# Patient Record
Sex: MALE | Race: WHITE | NOT HISPANIC OR LATINO | URBAN - METROPOLITAN AREA
[De-identification: names, ages, dates, MRNs, and addresses within clinical notes are randomized per-mention and may not be internally consistent; named-entity substitution may affect disease eponyms.]

---

## 2018-03-23 ENCOUNTER — OFFICE VISIT CONVERTED (OUTPATIENT)
Dept: UROLOGY | Facility: CLINIC | Age: 83
End: 2018-03-23
Attending: UROLOGY

## 2018-03-23 ENCOUNTER — CONVERSION ENCOUNTER (OUTPATIENT)
Dept: SURGERY | Facility: CLINIC | Age: 83
End: 2018-03-23

## 2018-05-05 ENCOUNTER — OFFICE VISIT CONVERTED (OUTPATIENT)
Dept: FAMILY MEDICINE CLINIC | Age: 83
End: 2018-05-05
Attending: FAMILY MEDICINE

## 2018-06-06 ENCOUNTER — OFFICE VISIT CONVERTED (OUTPATIENT)
Dept: FAMILY MEDICINE CLINIC | Age: 83
End: 2018-06-06
Attending: FAMILY MEDICINE

## 2018-08-24 ENCOUNTER — OFFICE VISIT CONVERTED (OUTPATIENT)
Dept: FAMILY MEDICINE CLINIC | Age: 83
End: 2018-08-24
Attending: FAMILY MEDICINE

## 2018-09-04 ENCOUNTER — OFFICE VISIT CONVERTED (OUTPATIENT)
Dept: FAMILY MEDICINE CLINIC | Age: 83
End: 2018-09-04
Attending: FAMILY MEDICINE

## 2019-03-27 ENCOUNTER — CONVERSION ENCOUNTER (OUTPATIENT)
Dept: SURGERY | Facility: CLINIC | Age: 84
End: 2019-03-27

## 2019-03-27 ENCOUNTER — OFFICE VISIT CONVERTED (OUTPATIENT)
Dept: SURGERY | Facility: CLINIC | Age: 84
End: 2019-03-27
Attending: PHYSICIAN ASSISTANT

## 2019-06-10 ENCOUNTER — HOSPITAL ENCOUNTER (OUTPATIENT)
Dept: OTHER | Facility: HOSPITAL | Age: 84
Discharge: HOME | End: 2019-06-10
Attending: FAMILY MEDICINE

## 2019-06-10 ENCOUNTER — OFFICE VISIT CONVERTED (OUTPATIENT)
Dept: FAMILY MEDICINE CLINIC | Age: 84
End: 2019-06-10
Attending: FAMILY MEDICINE

## 2019-06-10 LAB
ALBUMIN SERPL-MCNC: 3.9 G/DL (ref 3.5–5)
ALBUMIN/GLOB SERPL: 1.5 {RATIO} (ref 1.4–2.6)
ALP SERPL-CCNC: 59 U/L (ref 56–155)
ALT SERPL-CCNC: 6 U/L (ref 10–40)
ANION GAP SERPL CALC-SCNC: 14 MMOL/L (ref 8–19)
APTT BLD: 26.2 S (ref 22.2–34.2)
AST SERPL-CCNC: 17 U/L (ref 15–50)
BILIRUB SERPL-MCNC: 0.99 MG/DL (ref 0.2–1.3)
BUN SERPL-MCNC: 7 MG/DL (ref 5–25)
BUN/CREAT SERPL: 9 {RATIO} (ref 6–20)
CALCIUM SERPL-MCNC: 9.1 MG/DL (ref 8.7–10.4)
CHLORIDE SERPL-SCNC: 102 MMOL/L (ref 99–111)
CONV CO2: 24 MMOL/L (ref 22–32)
CONV TOTAL PROTEIN: 6.5 G/DL (ref 6.3–8.2)
CREAT UR-MCNC: 0.81 MG/DL (ref 0.7–1.2)
ERYTHROCYTE [DISTWIDTH] IN BLOOD BY AUTOMATED COUNT: 19.6 % (ref 11.5–14.5)
FERRITIN SERPL-MCNC: 121 NG/ML (ref 30–300)
GFR SERPLBLD BASED ON 1.73 SQ M-ARVRAT: >60 ML/MIN/{1.73_M2}
GLOBULIN UR ELPH-MCNC: 2.6 G/DL (ref 2–3.5)
GLUCOSE SERPL-MCNC: 102 MG/DL (ref 70–99)
HBA1C MFR BLD: 10.5 G/DL (ref 14–18)
HCT VFR BLD AUTO: 31.8 % (ref 42–52)
INR PPP: 1.14 (ref 2–3)
IRON SERPL-MCNC: 34 UG/DL (ref 70–180)
MCH RBC QN AUTO: 32.6 PG (ref 27–31)
MCHC RBC AUTO-ENTMCNC: 33 G/DL (ref 33–37)
MCV RBC AUTO: 98.8 FL (ref 80–96)
OSMOLALITY SERPL CALC.SUM OF ELEC: 280 MOSM/KG (ref 273–304)
PLATELET # BLD AUTO: 136 10*3/UL (ref 130–400)
PMV BLD AUTO: 12.2 FL (ref 7.4–10.4)
POTASSIUM SERPL-SCNC: 4.2 MMOL/L (ref 3.5–5.3)
PROTHROMBIN TIME: 11.5 S (ref 9.4–12)
RBC # BLD AUTO: 3.22 10*6/UL (ref 4.7–6.1)
SODIUM SERPL-SCNC: 136 MMOL/L (ref 135–147)
WBC # BLD AUTO: 6.45 10*3/UL (ref 4.8–10.8)

## 2019-07-25 ENCOUNTER — OFFICE VISIT CONVERTED (OUTPATIENT)
Dept: FAMILY MEDICINE CLINIC | Age: 84
End: 2019-07-25
Attending: FAMILY MEDICINE

## 2019-08-20 ENCOUNTER — OFFICE VISIT CONVERTED (OUTPATIENT)
Dept: FAMILY MEDICINE CLINIC | Age: 84
End: 2019-08-20
Attending: FAMILY MEDICINE

## 2020-10-15 ENCOUNTER — HOSPITAL ENCOUNTER (OUTPATIENT)
Dept: OTHER | Facility: HOSPITAL | Age: 85
Discharge: HOME | End: 2020-10-15
Attending: FAMILY MEDICINE

## 2020-10-15 ENCOUNTER — OFFICE VISIT CONVERTED (OUTPATIENT)
Dept: FAMILY MEDICINE CLINIC | Age: 85
End: 2020-10-15
Attending: FAMILY MEDICINE

## 2020-10-15 LAB
ERYTHROCYTE [DISTWIDTH] IN BLOOD BY AUTOMATED COUNT: 17.2 % (ref 11.6–14.4)
HCT VFR BLD AUTO: 33.4 % (ref 42–52)
HGB BLD-MCNC: 11.2 G/DL (ref 14–18)
MCH RBC QN AUTO: 33.6 PG (ref 27–31)
MCHC RBC AUTO-ENTMCNC: 33.5 G/DL (ref 33–37)
MCV RBC AUTO: 100.3 FL (ref 80–96)
PLATELET # BLD AUTO: 108 10*3/UL (ref 130–400)
PMV BLD AUTO: ABNORMAL FL (ref 9.4–12.4)
RBC # BLD AUTO: 3.33 10*6/UL (ref 4.7–6.1)
WBC # BLD AUTO: 11.89 10*3/UL (ref 4.8–10.8)

## 2020-10-16 LAB
ALBUMIN SERPL-MCNC: 3.9 G/DL (ref 3.5–5)
ALBUMIN/GLOB SERPL: 1.5 {RATIO} (ref 1.4–2.6)
ALP SERPL-CCNC: 72 U/L (ref 56–155)
ALT SERPL-CCNC: <5 U/L (ref 10–40)
ANION GAP SERPL CALC-SCNC: 18 MMOL/L (ref 8–19)
AST SERPL-CCNC: 20 U/L (ref 15–50)
BILIRUB SERPL-MCNC: 1.65 MG/DL (ref 0.2–1.3)
BUN SERPL-MCNC: 14 MG/DL (ref 5–25)
BUN/CREAT SERPL: 18 {RATIO} (ref 6–20)
CALCIUM SERPL-MCNC: 8.8 MG/DL (ref 8.7–10.4)
CHLORIDE SERPL-SCNC: 101 MMOL/L (ref 99–111)
CONV CO2: 20 MMOL/L (ref 22–32)
CONV TOTAL PROTEIN: 6.5 G/DL (ref 6.3–8.2)
CREAT UR-MCNC: 0.8 MG/DL (ref 0.7–1.2)
FERRITIN SERPL-MCNC: 319 NG/ML (ref 30–300)
FOLATE SERPL-MCNC: 12.2 NG/ML (ref 4.8–20)
GFR SERPLBLD BASED ON 1.73 SQ M-ARVRAT: >60 ML/MIN/{1.73_M2}
GLOBULIN UR ELPH-MCNC: 2.6 G/DL (ref 2–3.5)
GLUCOSE SERPL-MCNC: 101 MG/DL (ref 70–99)
IRON SERPL-MCNC: 22 UG/DL (ref 70–180)
OSMOLALITY SERPL CALC.SUM OF ELEC: 281 MOSM/KG (ref 273–304)
POTASSIUM SERPL-SCNC: 4.2 MMOL/L (ref 3.5–5.3)
SODIUM SERPL-SCNC: 135 MMOL/L (ref 135–147)
TSH SERPL-ACNC: 0.87 M[IU]/L (ref 0.27–4.2)
VIT B12 SERPL-MCNC: 319 PG/ML (ref 211–911)

## 2021-05-15 VITALS — BODY MASS INDEX: 30.23 KG/M2 | HEIGHT: 68 IN | RESPIRATION RATE: 14 BRPM | WEIGHT: 199.5 LBS

## 2021-05-16 VITALS — BODY MASS INDEX: 27.36 KG/M2 | HEIGHT: 72 IN | RESPIRATION RATE: 16 BRPM | WEIGHT: 202 LBS

## 2021-05-18 NOTE — PROGRESS NOTES
Keenan Hall 04/12/1930     Office/Outpatient Visit    Visit Date: Thu, Jul 25, 2019 09:29 am    Provider: Steve Medina MD (Assistant: Isabella Palacios RN)    Location: [facility]        Electronically signed by Steve Medina MD on  07/26/2019 08:01:03 PM                             SUBJECTIVE:        CC: physical exam         HPI:         Ed is here for a Medicare wellness visit.  The required HRA questions are integrated within this visit note. Family medical history and individual medical/surgical history were reviewed and updated.  A current height, weight, BMI, blood pressure, and pulse were recorded in the vitals section of the note and have been reviewed. Patient's medications, including supplements, were recorded in the chart and reviewed.  Current providers and suppliers were reviewed and updated.          Self-Assessment of Health: He rates his health as very good. He rates his confidence of being able to control/manage most of his health problems as very confident. His physical/emotional health has limited his social activites not at all.  A review of cognitive impairment was performed, including ability to drive a car, manage finances, and any memory changes, and was found to be negative.  A review of functional ability, including bathing, dressing, walking, and urine/bowel continence as well as level of safety was performed and was found to be negative.  Falls Risk: Has not had any falls or only one fall without injury in the past year.  He denies having trouble hearing the TV/radio when others do not, having to strain to hear or understand conversations and wearing hearing aid(s).  Concerning home safety, he reports that at home he DOES have adequate lighting, a skid resistant shower/tub, grab bars in the bath, handrails on stairs, functioning smoke alarms and absence of throw rugs.          Immunization Status: Up to date; Physical Activity: He exercises but less than 20 minutes 3  days per week; Type of diet patient normally eats is described as well-balanced with fruits and vegetables Tobacco: He has a past history of cigarette smoking; quit date:  1989.      ROS:     CONSTITUTIONAL:  Negative for chills and fever.      CARDIOVASCULAR:  Negative for chest pain and palpitations.      RESPIRATORY:  Negative for recent cough and dyspnea.      GASTROINTESTINAL:  Negative for abdominal pain, nausea and vomiting.      INTEGUMENTARY:  Negative for atypical mole(s) and rash.          PMH/FMH/SH:     Last Reviewed on 7/25/2019 10:15 AM by Steve Medina    Past Medical History:             PAST MEDICAL HISTORY         Positive for    Atrial Fibrillation;     Positive for    diverticular hemmorage 4/2017;     Positive for    Benign Prostatic Hypertrophy;     Positive for    Shingles 2009;         CURRENT MEDICAL PROVIDERS:    Cardiologist: [facility] Cardiologist    Dermatologist: Dr. Bullock    Ophthalmologist: Melquiades- optomitraureliano    Dentist - Quinten         ADVANCE DIRECTIVE Living will His daughters would make decisions for him if needed.          PREVENTIVE HEALTH MAINTENANCE             COLORECTAL CANCER SCREENING: Up to date (colonoscopy q10y; sigmoidoscopy q5y; Cologuard q3y) was last done 04/2011, Results are in chart; colonoscopy with normal results; diverticulosis         Surgical History:         Biopsy of colonic polyp; 1975         Procedures:     Positive for    Cataract Removal: right; 2009; and    Fracture(s):    Closed fracture of knee;     Positive for    Cataract Removal and    shoulder surgery;;         Family History:     Father: Congestive Heart Failure ( age 85 )     Mother: Lymphoma         Social History: Retired.     Occupation: Retired (Prior occupation: [employer])     Marital Status:  X 2     Children: 5 children         Tobacco/Alcohol/Supplements:     Last Reviewed on 7/25/2019 10:15 AM by Steve Medina    Tobacco: He has a past history of  cigarette smoking; quit date:  1989.          Alcohol: Frequency: Daily When he drinks, the average quantity of alcohol is 4 drinks.          Substance Abuse History:     Last Reviewed on 7/25/2019 10:15 AM by Steve Medina        Mental Health History:     Last Reviewed on 7/25/2019 10:15 AM by Steve Medina        Communicable Diseases (eg STDs):     Last Reviewed on 7/25/2019 10:15 AM by Steve Medina            Current Problems:     Last Reviewed on 7/25/2019 10:15 AM by Steve Medina    BPH     Hypertension     Low back pain     Hypercholesterolemia     Hemorrhagic diverticulosis of colon     Coronary artery disease, of native coronary artery     Atrial fibrillation     Anemia due to acute blood loss     Gastrointestinal hemorrhage, unspecified     Follow-up examination         Immunizations:     Td adult 5/23/2017     Prevnar 13 (Pneumococcal PCV 13) 1/5/2015     Fluvirin (4 + years dose) 8/8/2014     Fluzone High-Dose pf (>=65 yr) 10/15/2016     Influenza Virus Vaccine, unspecified formulation 9/1/2017     Pneumococcal, 23-valent IM/SC (adult and pt >=2yr) 12/0/2002     PNEUMOVAX 23 (Pneumococcal PPV23) 12/11/2007     Adacel (Tdap) 12/11/2007     Zostavax (Zoster live) 0/0/2010         Allergies:     Last Reviewed on 7/25/2019 10:15 AM by Steve Medina    Bactrim DS:    Doxycycline:    Meloxicam: rectal bleeding    Meloxicam: bleeding (Adverse Reaction)    Codeine:        Current Medications:     Last Reviewed on 7/25/2019 10:15 AM by Steve Medina    Finasteride 5mg Tablet Take 1 tablet(s) by mouth daily     Centrum Silver Men 50+ Tablet Take 1 tablet(s) by mouth daily with food.     Mupirocin 2% Topical Ointment apply affected area Qam         OBJECTIVE:        Vitals:         Current: 7/25/2019 9:37:06 AM    Ht:  6 ft, 0 in;  Wt: 196 lbs;  BMI: 26.6    T: 97.5 F (oral);  BP: 121/62 mm Hg (right arm, sitting);  P: 83 bpm (right arm (BP Cuff), sitting);   sCr: 0.81 mg/dL;  GFR: 64.51        Exams:     PHYSICAL EXAM:     GENERAL: Vitals recorded well developed, well nourished;     EYES: extraocular movements intact; conjunctiva and cornea are normal; PERRL; binocular vision is 20/25 - hearing is somewhat diminished bilaterally     E/N/T: EARS:  normal external auditory canals and tympanic membranes;  grossly normal hearing; OROPHARYNX:  normal mucosa, dentition, gingiva, and posterior pharynx;     NECK: range of motion is normal; thyroid is non-palpable; carotid exam is normal with good upstroke and no bruits;     RESPIRATORY: normal respiratory rate and pattern with no distress; normal breath sounds with no rales, rhonchi, wheezes or rubs;     CARDIOVASCULAR: normal rate; rhythm is regular;  no systolic murmur;     GASTROINTESTINAL: nontender; normal bowel sounds; no masses;     LYMPHATIC: no enlargement of cervical or facial nodes; no supraclavicular nodes;     SKIN:  no significant rashes or lesions; no suspicious moles;     NEUROLOGIC: mental status: alert and oriented x 3; cranial nerves II-XII grossly intact;     PSYCHIATRIC: appropriate affect and demeanor; normal psychomotor function;         Lab/Test Results:             Hemoglobin:  13.4 (07/25/2019),     Performed by::  asael (07/25/2019),             ASSESSMENT           V70.0   Z00.01  Yearly physical exam              DDx:     285.1   D62  Anemia due to acute blood loss              DDx:         ORDERS:         Radiology/Test Orders:       3017F  Colorectal CA screen results documented and reviewed (PV)  (In-House)           Lab Orders:       [id number]  BDHGB - [facility] Hemoglobin (Hgb)  (In-House)           Procedures Ordered:       [id number]  Collection of capillary blood specimen (eg, finger, heel, ear stick)  (In-House)           Other Orders:         Depression screen negative  (In-House)         1101F  Pt screen for fall risk; document no falls in past year or only 1 fall w/o injury in past year (ADI)   (In-House)           Positive EtOH screen with counseling documented  (In-House)           Subsequent Annual Well Visit Medicare (x1)                 PLAN:          Yearly physical exam     Today, we have reviewed Ed's care.  I'm not recommending changes in medication for now.  We will repeat a hemoglobin level at this time and see where that stands.  See attached wellness recommendations.  His memory screening was borderline, but he is very functional and I am not suggesting medication related to that or alteration of habits.     MIPS PHQ-9 Depression Screening: Completed form scanned and in chart; Total Score 4; Negative Depression Screen Positive alcohol screen: Brief counseling on the harms of alcohol use (less than 8 minutes).      ADDENDUM - Please let Ed know that his blood counts are normal.  No changes there.  With regard to metoprolol, Dr. Zurita's office does recommend he stay on that.  I did send a refill just now for a year to [facility] and would advise him to continue it.  Let me know if he has concerns. - Steve Medina MD - 7/2/519 - 12:42 7/25/19 1:00pm  pt inf/th           Orders:         Depression screen negative  (In-House)         1101F  Pt screen for fall risk; document no falls in past year or only 1 fall w/o injury in past year (ADI)  (In-House)           Positive EtOH screen with counseling documented  (In-House)         3017F  Colorectal CA screen results documented and reviewed (PV)  (In-House)            Anemia due to acute blood loss     LABORATORY:  Labs ordered to be performed today include Hemoglobin.            Orders:       [id number]  BDHGB - [facility] Hemoglobin (Hgb)  (In-House)         21341  Collection of capillary blood specimen (eg, finger, heel, ear stick)  (In-House)               CHARGE CAPTURE           **Please note: ICD descriptions below are intended for billing purposes only and may not represent clinical diagnoses**        Primary Diagnosis:         V70.0  Yearly physical exam            Z00.01    Encounter for general adult medical examination with abnormal findings              Orders:          [id number]   Preventive medicine, established patient, age 65+ years  (In-House)                                           Subsequent Annual Well Visit Medicare (x1)              Depression screen negative  (In-House)             1101F   Pt screen for fall risk; document no falls in past year or only 1 fall w/o injury in past year (ADI)  (In-House)                Positive EtOH screen with counseling documented  (In-House)             3017F   Colorectal CA screen results documented and reviewed (PV)  (In-House)           285.1 Anemia due to acute blood loss            D62    Acute posthemorrhagic anemia              Orders:          51191   BDHGB - [facility] Hemoglobin (Hgb)  (In-House)             52319   Collection of capillary blood specimen (eg, finger, heel, ear stick)  (In-House)

## 2021-05-18 NOTE — PROGRESS NOTES
Keenan Hall 04/12/1930     Office/Outpatient Visit    Visit Date: Wed, Jun 6, 2018 02:43 pm    Provider: Steve Medina MD (Assistant: Carmen Coto MA)    Location: [facility]        Electronically signed by Steve Medina MD on  06/16/2018 10:45:48 AM                             SUBJECTIVE:        CC: lower GI bleed     Ed is a 88 year old White male.  He is here today following a transition of care from an inpatient hospital: [facility]. The patient was admitted on 5-22-18 AND DISCHARGED 5-28-18. The patient was admitted for RECTAL BLEEDING. Our office called the patient within 48 hours of discharge and scheduled the follow-up appointment. During the patient's hospital stay the patient was treated by .  The patient is accompanied into the exam room by his daughter.          HPI:     Ed is in today for followup as noted above.  He developed significant lower gastrointestinal bleeding which led to hospital admission for the better part of a week.  He did receive transfusion while in the hospital and was treated appropriately.  Most of his home meds were stopped at discharge.  There was some concern that meloxicam may have contributed to this.  He does also have a history of diverticulosis of the colon and did have a very significant bleed approximately one year ago.          He also has history of presumed atrial fibrillation, although I do not have that clearly identified in his chart.  He has been on digoxin.  Most of his medications actually are prescribed through cardiology and specialists.  He since getting home has been doing relatively well.  He denies any recurrent bleeding.  He states his stool seems relatively normal.          His other history, problems, and medicines are reviewed today.  He did have a syncopal episode while he was in the hospital. Apparently there was some concern that he could have lost his pulse, and chest compressions were done briefly. He recovered from that  fairly well and is doing well overall.     ROS:     CONSTITUTIONAL:  Negative for chills and fever.      CARDIOVASCULAR:  Negative for palpitations.      RESPIRATORY:  Negative for recent cough and dyspnea.      GASTROINTESTINAL:  Negative for abdominal pain, nausea and vomiting.      INTEGUMENTARY:  Negative for atypical mole(s) and rash.          PMH/FMH/SH:     Last Reviewed on 6/06/2018 05:18 PM by Steve Medina    Past Medical History:             PAST MEDICAL HISTORY         Positive for    Atrial Fibrillation;     Positive for    diverticular hemmorage 4/2017;     Positive for    Benign Prostatic Hypertrophy;     Positive for    Shingles 2009;         CURRENT MEDICAL PROVIDERS:    Cardiologist: [facility] Cardiologist    Dermatologist: Dr. Bullock    Ophthalmologist: Melquiades- optomitraureliano    Dentist - Quinten         PREVENTIVE HEALTH MAINTENANCE             COLONOSCOPY: Done within the last 10 years was last done 4/19/17, which was abnormal     INFLUENZA VACCINE: was last done 10/15/16     Prevnar 13: was last done 1/5/15     PNEUMOCOCCAL 23 VACCINE: was last done 12/11/07     Tdap VACCINE: was last done 12/11/07         Surgical History:         Biopsy of colonic polyp; 1975         Procedures:     Positive for    Cataract Removal: right; 2009; and    Fracture(s):    Closed fracture of knee;     Positive for    Cataract Removal and    shoulder surgery;;         Family History:     Father: Congestive Heart Failure ( age 85 )     Mother: Lymphoma         Social History: Retired.     Occupation: Retired (Prior occupation: [employer])     Marital Status:  X 2     Children: 5 children         Tobacco/Alcohol/Supplements:     Last Reviewed on 6/06/2018 05:18 PM by Steve Medina    Tobacco: He has a past history of cigarette smoking; quit date:  1989.          Alcohol: Frequency: Daily When he drinks, the average quantity of alcohol is 4 drinks.          Substance Abuse History:     Last  Reviewed on 6/06/2018 05:18 PM by Steve Medina        Mental Health History:     Last Reviewed on 6/06/2018 05:18 PM by Steve Medina        Communicable Diseases (eg STDs):     Last Reviewed on 6/06/2018 05:18 PM by Steve Medina            Current Problems:     Last Reviewed on 6/06/2018 05:18 PM by Steve Medina    Hypercholesterolemia     Hemorrhagic diverticulosis of colon     Coronary artery disease, of native coronary artery     Atrial fibrillation     Anterior chest wall pain     Acute posthemorrhagic anemia     Follow-up examination     Neck pain         Immunizations:     Td adult 5/23/2017     Prevnar 13 (Pneumococcal PCV 13) 1/5/2015     Fluvirin (4 + years dose) 8/8/2014     Fluzone High-Dose pf (>=65 yr) 10/15/2016     Influenza Virus Vaccine, unspecified formulation 9/1/2017     Pneumococcal, 23-valent IM/SC (adult and pt >=2yr) 12/0/2002     PNEUMOVAX 23 (Pneumococcal PPV23) 12/11/2007     Adacel (Tdap) 12/11/2007     Zostavax (Zoster) 0/0/2010         Allergies:     Last Reviewed on 6/06/2018 05:18 PM by Steve Medina    Bactrim DS:    Doxycycline:    Codeine:    Meloxicam: rectal bleeding    Meloxicam: bleeding (Adverse Reaction)        Current Medications:     Last Reviewed on 6/06/2018 05:18 PM by Steve Medina    Digoxin 125mcg Tablet 1 tab daily     Atorvastatin Calcium 20mg Tablet Take 1 tablet(s) by mouth daily     Furosemide 40mg Tablets Take 1/2 tablet(s) by mouth daily     Potassium Chloride 10mEq Tablets, Extended Release Take 1 tablet(s) by mouth daily     Finasteride 5mg Tablet Take 1 tablet(s) by mouth daily     Centrum Silver Men 50+ Tablet Take 1 tablet(s) by mouth daily with food.     Aspirin (ASA) 81mg Tablets, Enteric Coated 1 tab daily     Protonix 40mg Tablets, Delayed Release 1 tab daily     Metoprolol Succinate 25mg Tablets, Extended Release Take 1/2 tab by mouth  daily         OBJECTIVE:        Vitals:         Current:  6/6/2018 2:45:54 PM    Ht:  6 ft, 0 in;  Wt: 193.4 lbs;  BMI: 26.2    T: 97.5 F (oral);  BP: 132/76 mm Hg (left arm, sitting);  P: 88 bpm (left arm (BP Cuff), sitting);  sCr: 0.93 mg/dL;  GFR: 56.91        Exams:     PHYSICAL EXAM:     GENERAL: Vitals recorded well developed, well nourished;     EYES: extraocular movements intact; conjunctiva and cornea are normal; PERRL;     E/N/T: EARS:  normal external auditory canals and tympanic membranes;  grossly normal hearing; OROPHARYNX:  normal mucosa, dentition, gingiva, and posterior pharynx;     NECK: range of motion is normal; thyroid is non-palpable;     RESPIRATORY: normal respiratory rate and pattern with no distress; normal breath sounds with no rales, rhonchi, wheezes or rubs;     CARDIOVASCULAR: normal rate; rhythm is regular;  no systolic murmur;     GASTROINTESTINAL: nontender; normal bowel sounds; no masses;     SKIN:  no significant rashes or lesions; no suspicious moles;     MUSCULOSKELETAL: there is mild chest wall tenderness noted on exam;     NEUROLOGIC: mental status: alert and oriented x 3; cranial nerves II-XII grossly intact;         ASSESSMENT           562.12   K57.31  Hemorrhagic diverticulosis of colon              DDx:     427.31   I48.91  Atrial fibrillation              DDx:     285.1   D62  Acute posthemorrhagic anemia              DDx:     786.52   R07.89  Anterior chest wall pain              DDx:         ORDERS:         Lab Orders:       [id number]  [facility] CBC w/o diff  (Send-Out)         [id number]  COMP - [facility] Comp. Metabolic Panel  (Send-Out)                   PLAN:          Hemorrhagic diverticulosis of colon     1)Ed seems well considering the illness that he had.      2)We will repeat blood work on him today.      3)We will contact [facility] for additional records to try to help clarify his discharge medicines.  He thinks that he is only discharged on furosemide and Protonix.  Will clarify that and be back in touch with him after we review  the blood work.         LABORATORY:  Labs ordered to be performed today include CBC W/O DIFF.            Orders:       [id number]  BD2 - [facility] CBC w/o diff  (Send-Out)             Patient Education Handouts:       Diverticulosis and Diverticulitis           Atrial fibrillation     LABORATORY:  Labs ordered to be performed today include Comprehensive metabolic panel.            Orders:       [id number]  COMP - [facility] Comp. Metabolic Panel  (Send-Out)            Acute posthemorrhagic anemia As above.          Anterior chest wall pain As above.             CHARGE CAPTURE           **Please note: ICD descriptions below are intended for billing purposes only and may not represent clinical diagnoses**        Primary Diagnosis:         562.12 Hemorrhagic diverticulosis of colon            K57.31    Diverticulosis of large intestine without perforation or abscess with bleeding              Orders:          [id number]   Transitional care manage service 14 day discharge  (In-House)           427.31 Atrial fibrillation            I48.91    Unspecified atrial fibrillation    285.1 Acute posthemorrhagic anemia            D62    Acute posthemorrhagic anemia    786.52 Anterior chest wall pain            R07.89    Other chest pain

## 2021-05-18 NOTE — PROGRESS NOTES
Keenan HallLeah 04/12/1930     Office/Outpatient Visit    Visit Date: Fri, Aug 24, 2018 11:00 am    Provider: Steve Medina MD (Assistant: Cassie Denny MA)    Location: [facility]        Electronically signed by Steve Medina MD on  08/24/2018 05:45:53 PM                             SUBJECTIVE:        CC: low back pain         HPI:     Ed has been having an off and on issue with pain in the L lower back.  This has been an issue for some time - 1-2 years.  He says that he has intermittent pain with this.  He is not able to sleep well with this.  He wanted to check on whether there was anything that could be done to help this.  He is not able to take NSAIDs due to previous bleeding issues.  He denies radiating pain into the leg or buttocks.  He says that this will eventually resolve.     ROS:     CONSTITUTIONAL:  Negative for chills and fever.      CARDIOVASCULAR:  Negative for chest pain and palpitations.      RESPIRATORY:  Negative for recent cough and dyspnea.      GASTROINTESTINAL:  Negative for abdominal pain, nausea and vomiting.          [facility]/:     Last Reviewed on 8/24/2018 11:19 AM by Steve Medina    Past Medical History:             PAST MEDICAL HISTORY         Positive for    Atrial Fibrillation;     Positive for    diverticular hemmorage 4/2017;     Positive for    Benign Prostatic Hypertrophy;     Positive for    Shingles 2009;         CURRENT MEDICAL PROVIDERS:    Cardiologist: [facility] Cardiologist    Dermatologist: Dr. Bullock    Ophthalmologist: Melquiades- optomitrist    Dentist - Quinten         PREVENTIVE HEALTH MAINTENANCE             COLORECTAL CANCER SCREENING: Up to date (colonoscopy q10y; sigmoidoscopy q5y; Cologuard q3y) was last done 04/2011, Results are in chart; colonoscopy with normal results; diverticulosis         Surgical History:         Biopsy of colonic polyp; 1975         Procedures:     Positive for    Cataract Removal: right; 2009; and     Fracture(s):    Closed fracture of knee;     Positive for    Cataract Removal and    shoulder surgery;;         Family History:     Father: Congestive Heart Failure ( age 85 )     Mother: Lymphoma         Social History: Retired.     Occupation: Retired (Prior occupation: [employer])     Marital Status:  X 2     Children: 5 children         Tobacco/Alcohol/Supplements:     Last Reviewed on 8/24/2018 11:19 AM by Steve Medina    Tobacco: He has a past history of cigarette smoking; quit date:  1989.          Alcohol: Frequency: Daily When he drinks, the average quantity of alcohol is 4 drinks.          Substance Abuse History:     Last Reviewed on 8/24/2018 11:19 AM by Steve Medina        Mental Health History:     Last Reviewed on 8/24/2018 11:19 AM by Steve Medina        Communicable Diseases (eg STDs):     Last Reviewed on 8/24/2018 11:19 AM by Steve Medina            Current Problems:     Last Reviewed on 8/24/2018 11:19 AM by Steve Medina    Low back pain     Hypercholesterolemia     Hemorrhagic diverticulosis of colon     Coronary artery disease, of native coronary artery     Atrial fibrillation         Immunizations:     Td adult 5/23/2017     Prevnar 13 (Pneumococcal PCV 13) 1/5/2015     Fluvirin (4 + years dose) 8/8/2014     Fluzone High-Dose pf (>=65 yr) 10/15/2016     Influenza Virus Vaccine, unspecified formulation 9/1/2017     Pneumococcal, 23-valent IM/SC (adult and pt >=2yr) 12/0/2002     PNEUMOVAX 23 (Pneumococcal PPV23) 12/11/2007     Adacel (Tdap) 12/11/2007     Zostavax (Zoster) 0/0/2010         Allergies:     Last Reviewed on 8/24/2018 11:19 AM by Steve Medina    Bactrim DS:    Doxycycline:    Meloxicam: rectal bleeding    Meloxicam: bleeding (Adverse Reaction)    Codeine:        Current Medications:     Last Reviewed on 8/24/2018 11:19 AM by Steve Medina    Furosemide 40mg Tablets Take 1/2 tablet(s) by mouth daily     Finasteride  5mg Tablet Take 1 tablet(s) by mouth daily     Centrum Silver Men 50+ Tablet Take 1 tablet(s) by mouth daily with food.     Aspirin (ASA) 81mg Tablets, Enteric Coated 1 tab daily     Metoprolol Succinate 25mg Tablets, Extended Release Take 1/2 tab by mouth  daily         OBJECTIVE:        Vitals:         Current: 8/24/2018 11:03:22 AM    Ht:  6 ft, 0 in;  Wt: 196.2 lbs;  BMI: 26.6    T: 97.8 F (oral);  BP: 132/75 mm Hg (left arm, sitting);  P: 75 bpm (left arm (BP Cuff), sitting);  sCr: 0.94 mg/dL;  GFR: 56.65        Exams:     PHYSICAL EXAM:     GENERAL: Vitals recorded well developed, well nourished;     NECK: range of motion is normal; thyroid is non-palpable;     RESPIRATORY: normal respiratory rate and pattern with no distress; normal breath sounds with no rales, rhonchi, wheezes or rubs;     CARDIOVASCULAR: normal rate; rhythm is regular;  no systolic murmur;     GASTROINTESTINAL: nontender; normal bowel sounds; no masses;     SKIN:  no significant rashes or lesions; no suspicious moles;     MUSCULOSKELETAL: there is not any focal tenderness over the spine and the mid back;     NEUROLOGIC: mental status: alert and oriented x 3; cranial nerves II-XII grossly intact;         ASSESSMENT           724.2   M54.5  Low back pain              DDx:         ORDERS:         Meds Prescribed:       Baclofen 10mg Tablet Take 1 tablet(s) by mouth bid prn  #40 (Forty) tablet(s) Refills: 1                 PLAN:          Low back pain         RECOMMENDATIONS given include: Given Ed's health history, we have somewhat limited options.  This is an intermittent issue.  I'm going to recommend a trial of low dose baclofen to see if it is helpful when it flares.  Risks are discussed including possible confusion related to the medication..            Prescriptions:       Baclofen 10mg Tablet Take 1 tablet(s) by mouth bid prn  #40 (Forty) tablet(s) Refills: 1           Patient Education Handouts:       [facility] Medication Compliance               CHARGE CAPTURE           **Please note: ICD descriptions below are intended for billing purposes only and may not represent clinical diagnoses**        Primary Diagnosis:         724.2 Low back pain            M54.5    Low back pain              Orders:          [id number]   Office/outpatient visit; established patient, level 3  (In-House)

## 2021-05-18 NOTE — PROGRESS NOTES
Keenan HallLeah 04/12/1930     Office/Outpatient Visit    Visit Date: Sat, May 5, 2018 09:49 am    Provider: Francis Andujar MD (Assistant: Isabella Palacios RN)    Location: [facility]        Electronically signed by Francis Andujar MD on  05/05/2018 12:53:14 PM                             SUBJECTIVE:        CC:     Ed is a 88 year old White male.  Neck pain (PT IS NOT TAKING DIGOXIN, ATORVASTATIN OR POTASSIUM)         HPI:         Patient complains of neck pain.  The location of discomfort is posterior and on the left side.  There is no radiation.  The pain is characterized as moderate in intensity and intermittent.  Initial onset was 2 weeks ago.  There was no obvious precipitating event or injury.  He denies associated headache and neck stiffness.      ROS:     CONSTITUTIONAL:  Negative for chills and fever.      MUSCULOSKELETAL:  Negative for myalgias.      NEUROLOGICAL:  Negative for paresthesias and weakness.          PMH/FMH/SH:     Last Reviewed on 5/05/2018 10:29 AM by Francis Andujar    Past Medical History:             PAST MEDICAL HISTORY         Positive for    Atrial Fibrillation;     Positive for    diverticular hemmorage 4/2017;     Positive for    Benign Prostatic Hypertrophy;     Positive for    Shingles 2009;         CURRENT MEDICAL PROVIDERS:    Cardiologist: [facility] Cardiologist    Dermatologist: Dr. Bullock    Ophthalmologist: Melquiades- optomitraureliano    Dentist - Quinten         PREVENTIVE HEALTH MAINTENANCE             COLONOSCOPY: Done within the last 10 years was last done 4/19/17, which was abnormal     INFLUENZA VACCINE: was last done 10/15/16     Prevnar 13: was last done 1/5/15     PNEUMOCOCCAL 23 VACCINE: was last done 12/11/07     Tdap VACCINE: was last done 12/11/07         Surgical History:         Biopsy of colonic polyp; 1975         Procedures:     Positive for    Cataract Removal: right; 2009; and    Fracture(s):    Closed fracture of knee;     Positive  for    Cataract Removal and    shoulder surgery;;         Family History:     Father: Congestive Heart Failure ( age 85 )     Mother: Lymphoma         Social History: Retired.     Occupation: Retired (Prior occupation: [employer])     Marital Status:  X 2     Children: 5 children         Tobacco/Alcohol/Supplements:     Last Reviewed on 5/05/2018 10:26 AM by Francis Andujar    Tobacco: He has a past history of cigarette smoking; quit date:  1989.          Alcohol: Frequency: Daily When he drinks, the average quantity of alcohol is 4 drinks.          Substance Abuse History:     Last Reviewed on 7/24/2017 04:13 PM by Steve Medina        Mental Health History:     Last Reviewed on 7/24/2017 04:13 PM by Steve Medina        Communicable Diseases (eg STDs):     Last Reviewed on 7/24/2017 04:13 PM by Steve Medina            Current Problems:     Last Reviewed on 5/05/2018 10:31 AM by Francis Andujar    Hypercholesterolemia     Hemorrhagic diverticulosis of colon     Coronary artery disease, of native coronary artery     Atrial fibrillation         Immunizations:     Td adult 5/23/2017     Prevnar 13 (Pneumococcal PCV 13) 1/5/2015     Fluvirin (4 + years dose) 8/8/2014     Fluzone High-Dose pf (>=65 yr) 10/15/2016     Influenza Virus Vaccine, unspecified formulation 9/1/2017     Pneumococcal, 23-valent IM/SC (adult and pt >=2yr) 12/0/2002     PNEUMOVAX 23 (Pneumococcal PPV23) 12/11/2007     Adacel (Tdap) 12/11/2007     Zostavax (Zoster) 0/0/2010         Allergies:     Last Reviewed on 5/05/2018 10:26 AM by Francis Andujar    Bactrim DS:    Doxycycline:    Codeine:        Current Medications:     Last Reviewed on 5/05/2018 10:30 AM by Francis Andujar    Digoxin 125mcg Tablet 1 tab daily     Atorvastatin Calcium 20mg Tablet Take 1 tablet(s) by mouth daily     Furosemide 40mg Tablets Take 1/2 tablet(s) by mouth daily     Potassium Chloride 10mEq Tablets, Extended  Release Take 1 tablet(s) by mouth daily     Finasteride 5mg Tablet Take 1 tablet(s) by mouth daily     Centrum Silver Men 50+ Tablet Take 1 tablet(s) by mouth daily with food.     Aspirin (ASA) 81mg Tablets, Enteric Coated 1 tab daily     Metoprolol Succinate 25mg Tablets, Extended Release Take 1/2 tab by mouth  daily         OBJECTIVE:        Vitals:         Current: 5/5/2018 9:53:14 AM    Ht:  6 ft, 0 in;  Wt: 203.6 lbs;  BMI: 27.6    T: 98.4 F (oral);  BP: 121/75 mm Hg (left arm, sitting);  P: 78 bpm (left arm (BP Cuff), sitting);  sCr: 0.93 mg/dL;  GFR: 58.17        Exams:     PHYSICAL EXAM:     GENERAL: Vitals recorded well developed, well nourished;  well groomed;  no apparent distress;     NECK: trachea is midline; thyroid is non-palpable;     RESPIRATORY: normal respiratory rate and pattern with no distress; normal breath sounds with no rales, rhonchi, wheezes or rubs;     CARDIOVASCULAR: normal rate; rhythm is regular;     LYMPHATIC: no enlargement of cervical or facial nodes;     NEUROLOGIC: GROSSLY INTACT NECK examination: Inspection:  normal;     Palpation: no pain elicited; NONTENDER BUT INDICATES LEFT UPPER PARACERVICAL AREA;     Neurovascular:  normal;     Muscular Strength:  normal;     Range of Motion: limited active ROM; ROM LIMITED BUT HE STATES HE IS AT BASELINE.;         ASSESSMENT           723.1   M54.2  Neck pain NONSPECIFIC              DDx:         ORDERS:         Meds Prescribed:       Meloxicam 15mg Tablet Take 1 tablet(s) by mouth daily  #15 (Fifteen) tablet(s) Refills: 1         Radiology/Test Orders:       [id number]  Radiologic examination, spine, cervical; minimum of four views  (Send-Out)                   PLAN:          Neck pain         RADIOLOGY:  I have ordered a C-Spine x-ray series to be done today.      MEDICATIONS: I have prescribed an NSAID.      RECOMMENDATIONS given include: range-of-motion exercises for the neck and cold packs.      FOLLOW-UP: Schedule follow-up appointments  on a p.r.n. basis.      CONSIDER P.T. YOLANDA;           Prescriptions:       Meloxicam 15mg Tablet Take 1 tablet(s) by mouth daily  #15 (Fifteen) tablet(s) Refills: 1           Orders:       [id number]  Radiologic examination, spine, cervical; minimum of four views  (Send-Out)               Patient Recommendations:        For  Neck pain:     An exercise program is the most important factor in long term relief of neck pain.  Early on, if the neck pain becomes worse while exercising, you may need to delay the exercise program a few more days. Try cold packs on the tight, painful areas in the neck.  Schedule follow-up appointments as needed.              CHARGE CAPTURE           **Please note: ICD descriptions below are intended for billing purposes only and may not represent clinical diagnoses**        Primary Diagnosis:         723.1 Neck pain            M54.2    Cervicalgia              Orders:          [id number]   Office/outpatient visit; established patient, level 3  (In-House)

## 2021-05-18 NOTE — PROGRESS NOTES
Keenan Hall  04/12/1930     Office/Outpatient Visit    Visit Date: Thu, Oct 15, 2020 10:15 am    Provider: Steve Medina MD (Assistant: France Ramirez MA)    Location: [facility]        Electronically signed by Steve Medina MD on  10/15/2020 07:19:48 PM                             Subjective:        CC: constipation, chest pain    HPI:       Ed is being seen today for follow up on constipation.  He has gone several days now without bowel movement.  He is not sure what has caused this.  He denies abdominal pain.  He has had issues with this previously.  He has been seen here once a year or so for this.  He is not currently taking any kind of laxative.  He denies rectal pain or bleeding.  His most recent colonoscopy was in 2011 and was normal other than diverticulosis.          Ed is also having an issue with pain in the L side of the chest.  He says the pain is located in the back.  He says that it is actually more down in the lower back near the top of his hip bone.  He denies any rash being present.  He denies fall or injury.  The pain 'pretty much stays there'.  It is off and on.  He is not aware of anything that causes it.  Aggravating factors - walking can make this worse.  Alleviating factors - with sitting or laying down.  He has not been taking any medication for this.  Ed currently lives by himself.  He feels that he is able to care for himself, but his three daughters do give him some assistance.    ROS:     CONSTITUTIONAL:  Negative for chills and fever.      CARDIOVASCULAR:  Negative for chest pain and palpitations.      RESPIRATORY:  Negative for recent cough and dyspnea.      GASTROINTESTINAL:  Negative for abdominal pain, nausea and vomiting.      INTEGUMENTARY:  Negative for atypical mole(s) and rash.      PSYCHIATRIC:  Negative for anxiety and depression.          Past Medical History / Family History / Social History:         Last Reviewed on 10/15/2020 11:01 AM by  Steve Medina    Past Medical History:             PAST MEDICAL HISTORY         Positive for    Atrial Fibrillation;     Positive for    diverticular hemmorage 4/2017;     Positive for    Benign Prostatic Hypertrophy;     Positive for    Shingles 2009;         CURRENT MEDICAL PROVIDERS:    Cardiologist: [facility] Cardiologist    Dermatologist: Dr. Bullock    Ophthalmologist: Melquiades- optomitraureliano    Dentist - Quinten         ADVANCE DIRECTIVE Living will His daughters would make decisions for him if needed.          PREVENTIVE HEALTH MAINTENANCE             COLORECTAL CANCER SCREENING: Up to date (colonoscopy q10y; sigmoidoscopy q5y; Cologuard q3y) was last done 04/2011, Results are in chart; colonoscopy with normal results; diverticulosis         Surgical History:         Biopsy of colonic polyp; 1975     Procedures:     Positive for    Cataract Removal: right; 2009; and    Fracture(s):    Closed fracture of knee;     Positive for    Cataract Removal and    shoulder surgery;;         Family History:     Father: Congestive Heart Failure ( age 85 )     Mother: Lymphoma         Social History: Retired.     Occupation: Retired (Prior occupation: [employer])     Marital Status:  X 2     Children: 5 children         Tobacco/Alcohol/Supplements:     Last Reviewed on 10/15/2020 11:01 AM by Steve Medina    Tobacco: He has a past history of cigarette smoking; quit date:  1989.          Alcohol: Frequency: Daily When he drinks, the average quantity of alcohol is 4 drinks.          Substance Abuse History:     Last Reviewed on 10/15/2020 11:01 AM by Steve Medina        Mental Health History:     Last Reviewed on 10/15/2020 11:01 AM by Steve Medina        Communicable Diseases (eg STDs):     Last Reviewed on 10/15/2020 11:01 AM by Steve Medina        Current Problems:     Last Reviewed on 10/15/2020 11:01 AM by Steve Medina    Unspecified atrial fibrillation     Diverticulosis of large intestine without perforation or abscess with bleeding    Atherosclerotic heart disease of native coronary artery without angina pectoris    Other hyperlipidemia    Acute posthemorrhagic anemia    Low back pain    Nodular prostate without lower urinary tract symptoms    Essential (primary) hypertension    Other chest pain    Constipation, unspecified        Immunizations:     Td adult 5/23/2017    Prevnar 13 (Pneumococcal PCV 13) 1/5/2015    Fluvirin (4 + years dose) 8/8/2014    Fluzone High-Dose pf (>=65 yr) 10/15/2016    Influenza Virus Vaccine, unspecified formulation 9/1/2017    Pneumococcal, 23-valent IM/SC (adult and pt >=2yr) 12/0/2002    PNEUMOVAX 23 (Pneumococcal PPV23) 12/11/2007    Adacel (Tdap) 12/11/2007    Zostavax (Zoster live) 0/0/2010    Influenza, high dose seasonal 10/8/2020        Allergies:     Last Reviewed on 10/15/2020 11:01 AM by Steve Medina    Bactrim DS:      Doxycycline:      Meloxicam: rectal bleeding     Meloxicam: bleeding  (Adverse Reaction)    Codeine:          Current Medications:     Last Reviewed on 10/15/2020 11:01 AM by Steve Medina    Centrum Silver Ultra Men's 300-600-300 mcg oral tablet [Take 1 tablet(s) by mouth daily with food.]    finasteride 5 mg oral tablet [TAKE 1 TABLET BY MOUTH EVERY DAY.]    Metoprolol Succinate 25 mg oral Tablet, Extended Release 24 hr [Take 1/2 tablet(s) by mouth daily]    Mupirocin 2 % Topical Ointment [apply affected area Qam]    Protonix 40 mg oral tablet, delayed release (enteric coated) [1 po q day]    Polysaccharide Iron Complex 150 mg iron oral capsule [1 capsule bid]        Objective:        Vitals:         Current: 10/15/2020 10:22:04 AM    Ht:  6 ft, 0 in;  Wt: 180.4 lbs;  BMI: 24.5T: 97.5 F (temporal);  BP: 116/61 mm Hg (left arm, sitting);  P: 96 bpm (left arm (BP Cuff), sitting);  sCr: 0.81 mg/dL;  GFR: 61.11        Exams:     PHYSICAL EXAM:     GENERAL: Vitals recorded well developed, well  nourished;  disheveled appearance;  no apparent distress;     EYES: extraocular movements intact; conjunctiva and cornea are normal; PERRL;     NECK: range of motion is normal; thyroid is non-palpable;     RESPIRATORY: normal respiratory rate and pattern with no distress; normal breath sounds with no rales, rhonchi, wheezes or rubs;     CARDIOVASCULAR: normal rate; rhythm is regular;  no systolic murmur;     GASTROINTESTINAL: nontender; normal bowel sounds; no masses; rectal exam: normal tone; no masses;     LYMPHATIC: no enlargement of cervical or facial nodes; no supraclavicular nodes;     SKIN:  no significant rashes or lesions; no suspicious moles;     MUSCULOSKELETAL: there is not any focal finding on exam today that would explain the pain that he has - no tenderness in the lower back on exam;     NEUROLOGIC: mental status: alert and oriented x 3; cranial nerves II-XII grossly intact;     PSYCHIATRIC: appropriate affect and demeanor; normal psychomotor function;         Assessment:         K59.00   Constipation, unspecified       M54.5   Low back pain       I48.91   Unspecified atrial fibrillation       Z23   Encounter for immunization           ORDERS:         Meds Prescribed:       [New Rx] senna 8.6 mg oral tablet [take 1 tablets (8.6 mg) by oral route twice daily as needed], #30 (thirty) tablets, Refills: 1 (one)         Radiology/Test Orders:       3017F  Colorectal CA screen results documented and reviewed (PV)  (In-House)            22260  Radiologic examination, spine, lumbosacral;  minimum of four views  (Send-Out)            09785  Radiologic examination, pelvis; complete, minimum of three views  (Send-Out)              Lab Orders:       98925  BDCB2 - [facility] CBC w/o diff  (Send-Out)            51505  COMP - [facility] Comp. Metabolic Panel  (Send-Out)            25654  TSH - [facility] TSH  (Send-Out)              Procedures Ordered:       57102  PNEUMOVAX 23  (In-House)              Other Orders:          Depression screen positive and follow up plan documented  (In-House)              Depression screen negative  (In-House)            1101F  Pt screen for fall risk; document no falls in past year or only 1 fall w/o injury in past year (ADI)  (In-House)            1123F  Advance Care Planning discussed and doc; advance care plan or surrogate decision maker doc. in MR  (Send-Out)              Administration of pneumococcal vaccine  (x1)                  Plan:         Constipation, unspecified        RECOMMENDATIONS given include: His rectal exam does not show worrisome finding.  I am going to recommend trial of a mild laxative initially.  We will prescribe some Senna for him to use as needed in the near term.  If he does not see improvement, then he will let us know.  I am also going to X-ray the lower back and see what that shows.  This is actually not a chest pain but more of a low back pain.  No other acute finding is noted today.  We will see what his testing shows and be back in touch..  MIPS PHQ-9 Depression Screening: Completed form scanned and in chart; Total Score 13; Positive Depression Screen but after further evaluation the patient does not have a diagnosis of depression.  Positive Depression Screen: Suicide Risk Assessment completed--denies suicidal/homicidal ideation           Prescriptions:       [New Rx] senna 8.6 mg oral tablet [take 1 tablets (8.6 mg) by oral route twice daily as needed], #30 (thirty) tablets, Refills: 1 (one)           Orders:         Depression screen positive and follow up plan documented  (In-House)              Depression screen negative  (In-House)            1101F  Pt screen for fall risk; document no falls in past year or only 1 fall w/o injury in past year (ADI)  (In-House)            3017F  Colorectal CA screen results documented and reviewed (PV)  (In-House)            1123F  Advance Care Planning discussed and doc; advance care plan or surrogate  decision maker doc. in MR  (Send-Out)              Low back pain        RADIOLOGY:  I have ordered Lumbar/Sacral Spine X-ray to be done today.            Orders:       90497  Radiologic examination, spine, lumbosacral;  minimum of four views  (Send-Out)            90342  Radiologic examination, pelvis; complete, minimum of three views  (Send-Out)              Unspecified atrial fibrillation    LABORATORY:  Labs ordered to be performed today include CBC W/O DIFF, Comprehensive metabolic panel, and TSH.            Orders:       54763  BDCB2 - [facility] CBC w/o diff  (Send-Out)            80117  COMP - [facility] Comp. Metabolic Panel  (Send-Out)            02186  TSH - [facility] TSH  (Send-Out)              Encounter for immunization          Immunizations:       71341  PNEUMOVAX 23  (In-House)                Dose (ml): 0.5  Site: left deltoid  Route: intramuscular  Administered by: Isabella Palacios          : [employer]  Lot #: [id number]  Exp: 09/01/2021          NDC: [address]-27        Administration of pneumococcal vaccine  (x1)              Charge Capture:         Primary Diagnosis:     K59.00  Constipation, unspecified           Orders:      96902  Office/outpatient visit; established patient, level 4  (In-House)              Depression screen positive and follow up plan documented  (In-House)              Depression screen negative  (In-House)            1101F  Pt screen for fall risk; document no falls in past year or only 1 fall w/o injury in past year (ADI)  (In-House)            3017F  Colorectal CA screen results documented and reviewed (PV)  (In-House)              M54.5  Low back pain     I48.91  Unspecified atrial fibrillation     Z23  Encounter for immunization           Orders:      31761  PNEUMOVAX 23  (In-House)              Administration of pneumococcal vaccine  (x1)

## 2021-05-18 NOTE — PROGRESS NOTES
Keenan Hall 04/12/1930     Office/Outpatient Visit    Visit Date: Tue, Sep 4, 2018 10:22 am    Provider: Steve Medina MD (Assistant: Isabella Palacios RN)    Location: [facility]        Electronically signed by Steve Medina MD on  09/05/2018 07:54:15 AM                             SUBJECTIVE:        CC: cellulitis         HPI:     Ed is in today for follow up on infection in the L lower leg.  He bumped it with something about 3 weeks ago.  He has had some tear in the skin and redness.  It is not improving and may in fact be worsening.  He is not having drainage from the site other than some initial bleeding.  He says that it is causing pain only when he hits it.  He wanted to have it evaluated.     ROS:     CONSTITUTIONAL:  Negative for chills and fever.      CARDIOVASCULAR:  Negative for chest pain and palpitations.      RESPIRATORY:  Negative for recent cough and dyspnea.      GASTROINTESTINAL:  Negative for abdominal pain, nausea and vomiting.          PMH/FMH/SH:     Last Reviewed on 9/04/2018 10:35 AM by Steve Medina    Past Medical History:             PAST MEDICAL HISTORY         Positive for    Atrial Fibrillation;     Positive for    diverticular hemmorage 4/2017;     Positive for    Benign Prostatic Hypertrophy;     Positive for    Shingles 2009;         CURRENT MEDICAL PROVIDERS:    Cardiologist: [facility] Cardiologist    Dermatologist: Dr. Bullock    Ophthalmologist: Melquiades- optomitrist    Dentist - Quinten         PREVENTIVE HEALTH MAINTENANCE             COLORECTAL CANCER SCREENING: Up to date (colonoscopy q10y; sigmoidoscopy q5y; Cologuard q3y) was last done 04/2011, Results are in chart; colonoscopy with normal results; diverticulosis         Surgical History:         Biopsy of colonic polyp; 1975         Procedures:     Positive for    Cataract Removal: right; 2009; and    Fracture(s):    Closed fracture of knee;     Positive for    Cataract Removal and     shoulder surgery;;         Family History:     Father: Congestive Heart Failure ( age 85 )     Mother: Lymphoma         Social History: Retired.     Occupation: Retired (Prior occupation: [employer])     Marital Status:  X 2     Children: 5 children         Tobacco/Alcohol/Supplements:     Last Reviewed on 9/04/2018 10:35 AM by Steve Medina    Tobacco: He has a past history of cigarette smoking; quit date:  1989.          Alcohol: Frequency: Daily When he drinks, the average quantity of alcohol is 4 drinks.          Substance Abuse History:     Last Reviewed on 9/04/2018 10:35 AM by Steve Medina        Mental Health History:     Last Reviewed on 9/04/2018 10:35 AM by Steve Medina        Communicable Diseases (eg STDs):     Last Reviewed on 9/04/2018 10:35 AM by Steve Medina            Current Problems:     Last Reviewed on 9/04/2018 10:35 AM by Steve Medina    Low back pain     Hypercholesterolemia     Hemorrhagic diverticulosis of colon     Coronary artery disease, of native coronary artery     Atrial fibrillation     Cellulitis of the leg         Immunizations:     Td adult 5/23/2017     Prevnar 13 (Pneumococcal PCV 13) 1/5/2015     Fluvirin (4 + years dose) 8/8/2014     Fluzone High-Dose pf (>=65 yr) 10/15/2016     Influenza Virus Vaccine, unspecified formulation 9/1/2017     Pneumococcal, 23-valent IM/SC (adult and pt >=2yr) 12/0/2002     PNEUMOVAX 23 (Pneumococcal PPV23) 12/11/2007     Adacel (Tdap) 12/11/2007     Zostavax (Zoster) 0/0/2010         Allergies:     Last Reviewed on 9/04/2018 10:35 AM by Steve Medina    Bactrim DS:    Doxycycline:    Meloxicam: rectal bleeding    Meloxicam: bleeding (Adverse Reaction)    Codeine:        Current Medications:     Last Reviewed on 9/04/2018 10:35 AM by Steve Medina    Furosemide 40mg Tablets Take 1/2 tablet(s) by mouth daily     Finasteride 5mg Tablet Take 1 tablet(s) by mouth daily     Centrum  Silver Men 50+ Tablet Take 1 tablet(s) by mouth daily with food.     Aspirin (ASA) 81mg Tablets, Enteric Coated 1 tab daily     Baclofen 10mg Tablet Take 1 tablet(s) by mouth bid prn     Metoprolol Succinate 25mg Tablets, Extended Release Take 1/2 tab by mouth  daily     Mupirocin 2% Topical Ointment apply affected area Qam         OBJECTIVE:        Vitals:         Current: 9/4/2018 10:27:13 AM    Ht:  6 ft, 0 in;  Wt: 196.8 lbs;  BMI: 26.7    T: 97.9 F (oral);  BP: 125/71 mm Hg (left arm, sitting);  P: 71 bpm (left arm (BP Cuff), sitting);  sCr: 0.94 mg/dL;  GFR: 56.73        Exams:     PHYSICAL EXAM:     GENERAL: Vitals recorded well developed, well nourished;     NECK: range of motion is normal; thyroid is non-palpable;     RESPIRATORY: normal respiratory rate and pattern with no distress; normal breath sounds with no rales, rhonchi, wheezes or rubs;     CARDIOVASCULAR: normal rate; rhythm is regular;  no systolic murmur;     GASTROINTESTINAL: nontender; normal bowel sounds; no masses;     SKIN: there is an area of eschar - no obvious abscess - there is some redness noted around the eschar which is on the posterior lower leg;         ASSESSMENT           682.6   L03.115   L03.114  Cellulitis of the leg              DDx:         ORDERS:         Meds Prescribed:       Cephalexin (Cephalexin)  500mg Capsules Take 1 capsule three times daily for 10 days.  #30 (Thirty) capsule(s) Refills: 0         Radiology/Test Orders:       3017F  Colorectal CA screen results documented and reviewed (PV)  (In-House)           Other Orders:         Calculated BMI above the upper parameter and a follow-up plan was documented in the medical record  (In-House)                   PLAN:          Cellulitis of the leg         RECOMMENDATIONS given include: This does not look too bad.  We will cover Ed as noted below.  I have recommended he try to leave the area alone for the most part.  However, it might benefit from some soaking with  warm water and epsom salt.  If this does not resolve over the next 2-3 weeks, he will return..  MIPS     COLORECTAL CANCER SCREENING: Results are in chart     BMI Elevated - Follow-Up Plan: He was provided education on weight loss strategies           Prescriptions:       Cephalexin (Cephalexin)  500mg Capsules Take 1 capsule three times daily for 10 days.  #30 (Thirty) capsule(s) Refills: 0           Orders:       3017F  Colorectal CA screen results documented and reviewed (PV)  (In-House)           Calculated BMI above the upper parameter and a follow-up plan was documented in the medical record  (In-House)             Patient Education Handouts:       Cellulitis              CHARGE CAPTURE           **Please note: ICD descriptions below are intended for billing purposes only and may not represent clinical diagnoses**        Primary Diagnosis:         682.6 Cellulitis of the leg            L03.115    Cellulitis of right lower limb           L03.114    Cellulitis of left upper limb              Orders:          76343   Office/outpatient visit; established patient, level 2  (In-House)             3017F   Colorectal CA screen results documented and reviewed (PV)  (In-House)                Calculated BMI above the upper parameter and a follow-up plan was documented in the medical record  (In-House)

## 2021-05-18 NOTE — PROGRESS NOTES
Keenan Hall 04/12/1930     Office/Outpatient Visit    Visit Date: Mon, Red 10, 2019 08:57 am    Provider: Steve Medina MD (Assistant: Isabella Palacios RN)    Location: [facility], [place]        Electronically signed by Steve Medina MD on  06/12/2019 06:19:00 AM                             SUBJECTIVE:        CC: GI bleed     Ed is a 89 year old White male.  He is here today following a transition of care from an inpatient hospital: [facility]. The patient was admitted on 5/22/19 and discharged on 5/31/19. The patient was admitted for GI bleed. Our office called the patient within 48 hours of discharge and scheduled the follow-up appointment. During the patient's hospital stay the patient was treated by Dr. Pillai. Medications have been reviewed and reconciled with discharge summary..  LASIX AND ASPIRIN ARE ON HOLD         HPI:     Ed is in today for follow up after his recent hospital stay.  He was admitted to [facility] after developing significant GI bleeding that was felt to be from a lower GI source.  He did not have any kind of scope done while in the hospital.  There was some discussion about this, but they did not move forward with that.  He was given blood while in [facility] and then sent home on iron.  He also was quite weak after the admission and required a rehab stay.  He has been back home now for the last 10 days or so.  He says that things are going well.  He does remain pretty weak though.  He denies pain or other issue.  He just wears out quickly.  He has not had any additional bleeding of which he is aware.  He denies black stool.  Ed says that he is eating okay.  He is not particularly hungry.  Home health is following him for the time being.         Ed was noted to have relatively low blood pressure readings while in [facility].  He was on furosemide prior to admission.  However, that was stopped.  He was discharged home on metoprolol.  He has not been checking his blood pressure at home.          Ed also has history of BPH.  He remains on finasteride for this and is urinating okay.  He denies any new urinary issues.     ROS:     CONSTITUTIONAL:  Negative for chills and fever.      CARDIOVASCULAR:  Negative for chest pain and palpitations.      RESPIRATORY:  Negative for recent cough and dyspnea.      GASTROINTESTINAL:  Negative for abdominal pain, nausea and vomiting.      GENITOURINARY:  Negative for dysuria and hematuria.      MUSCULOSKELETAL:  Negative for back pain and myalgias.      NEUROLOGICAL:  Positive for weakness ( generalized ).   Negative for paresthesias.      HEMATOLOGIC/LYMPHATIC:  Negative for easy bruising and excessive bleeding.      PSYCHIATRIC:  Negative for anxiety and depression.          PMH/FMH/SH:     Last Reviewed on 6/10/2019 09:28 AM by Steve Medina    Past Medical History:             PAST MEDICAL HISTORY         Positive for    Atrial Fibrillation;     Positive for    diverticular hemmorage 4/2017;     Positive for    Benign Prostatic Hypertrophy;     Positive for    Shingles 2009;         CURRENT MEDICAL PROVIDERS:    Cardiologist: [facility] Cardiologist    Dermatologist: Dr. Bullock    Ophthalmologist: Melquiades- optomitraureliano    Dentist - Quinten         PREVENTIVE HEALTH MAINTENANCE             COLORECTAL CANCER SCREENING: Up to date (colonoscopy q10y; sigmoidoscopy q5y; Cologuard q3y) was last done 04/2011, Results are in chart; colonoscopy with normal results; diverticulosis         Surgical History:         Biopsy of colonic polyp; 1975         Procedures:     Positive for    Cataract Removal: right; 2009; and    Fracture(s):    Closed fracture of knee;     Positive for    Cataract Removal and    shoulder surgery;;         Family History:     Father: Congestive Heart Failure ( age 85 )     Mother: Lymphoma         Social History: Retired.     Occupation: Retired (Prior occupation: [employer])     Marital Status:  X 2     Children: 5 children          Tobacco/Alcohol/Supplements:     Last Reviewed on 6/10/2019 09:28 AM by Steve Medina    Tobacco: He has a past history of cigarette smoking; quit date:  1989.          Alcohol: Frequency: Daily When he drinks, the average quantity of alcohol is 4 drinks.          Substance Abuse History:     Last Reviewed on 6/10/2019 09:28 AM by Steve Medina        Mental Health History:     Last Reviewed on 6/10/2019 09:28 AM by Steve Medina        Communicable Diseases (eg STDs):     Last Reviewed on 6/10/2019 09:28 AM by Steve Medina            Current Problems:     Last Reviewed on 6/10/2019 09:28 AM by Steve Medina    BPH     Hypertension     Low back pain     Hypercholesterolemia     Hemorrhagic diverticulosis of colon     Coronary artery disease, of native coronary artery     Atrial fibrillation     Anemia due to acute blood loss     Gastrointestinal hemorrhage, unspecified     Follow-up examination         Immunizations:     Td adult 5/23/2017     Prevnar 13 (Pneumococcal PCV 13) 1/5/2015     Fluvirin (4 + years dose) 8/8/2014     Fluzone High-Dose pf (>=65 yr) 10/15/2016     Influenza Virus Vaccine, unspecified formulation 9/1/2017     Pneumococcal, 23-valent IM/SC (adult and pt >=2yr) 12/0/2002     PNEUMOVAX 23 (Pneumococcal PPV23) 12/11/2007     Adacel (Tdap) 12/11/2007     Zostavax (Zoster live) 0/0/2010         Allergies:     Last Reviewed on 6/10/2019 09:28 AM by Steve Medina    Bactrim DS:    Doxycycline:    Meloxicam: rectal bleeding    Meloxicam: bleeding (Adverse Reaction)    Codeine:        Current Medications:     Last Reviewed on 6/10/2019 09:28 AM by Steve Medina    Finasteride 5mg Tablet Take 1 tablet(s) by mouth daily     Centrum Silver Men 50+ Tablet Take 1 tablet(s) by mouth daily with food.     Pantoprazole 40mg Tablets, Delayed Release 1 po q day     Polysaccharide Iron Complex 150mg Capsules 1 capsule bid     Mupirocin 2% Topical  Ointment apply affected area Qam     Metoprolol Succinate 25mg Tablets, Extended Release Take 1/2 tab by mouth  daily         OBJECTIVE:        Vitals:         Current: 6/10/2019 9:02:02 AM    Ht:  6 ft, 0 in;  Wt: 195.2 lbs;  BMI: 26.5    T: 97.7 F (oral);  BP: 117/71 mm Hg (left arm, sitting);  P: 79 bpm (left arm (BP Cuff), sitting);  sCr: 0.94 mg/dL;  GFR: 55.49        Exams:     PHYSICAL EXAM:     GENERAL: Vitals recorded well developed, well nourished;     EYES: extraocular movements intact; conjunctiva and cornea are normal; PERRL;     E/N/T: EARS:  normal external auditory canals and tympanic membranes;  grossly normal hearing; OROPHARYNX:  normal mucosa, dentition, gingiva, and posterior pharynx;     NECK: range of motion is normal; thyroid is non-palpable;     RESPIRATORY: normal respiratory rate and pattern with no distress; normal breath sounds with no rales, rhonchi, wheezes or rubs;     CARDIOVASCULAR: normal rate; rhythm is regular;  no systolic murmur;     GASTROINTESTINAL: nontender; normal bowel sounds; no masses;     SKIN:  no significant rashes or lesions; no suspicious moles;     NEUROLOGIC: mental status: alert and oriented x 3; cranial nerves II-XII grossly intact;     PSYCHIATRIC: appropriate affect and demeanor;         Lab/Test Results: Labs and D/C summaries from [facility] reviewed in detail.          ASSESSMENT           578.9   K92.2  Gastrointestinal hemorrhage, unspecified              DDx:     285.1   D62  Anemia due to acute blood loss              DDx:     401.1   I10  Hypertension              DDx:     414.01   I25.10  Coronary artery disease, of native coronary artery              DDx:     600.00   N40.0  BPH              DDx:         ORDERS:         Meds Prescribed:       Refill of: Pantoprazole 40mg Tablets, Delayed Release Take 1 tablet(s) by mouth daily  #90 (Ninety) tablet(s) Refills: 1         Radiology/Test Orders:       3017F  Colorectal CA screen results documented and  reviewed (PV)  (In-House)           Lab Orders:       92193  BDCB2 - [facility] CBC w/o diff  (Send-Out)         49950  FERR - [facility] Ferritin Serum  (Send-Out)         17072  IRON - [facility] Iron, serum  (Send-Out)         26225  COMP - [facility] Comp. Metabolic Panel  (Send-Out)         57527  PTPTT - [facility] PT AND PTT  (Send-Out)           Other Orders:         Depression screen negative  (In-House)         1101F  Pt screen for fall risk; document no falls in past year or only 1 fall w/o injury in past year (ADI)  (In-House)           Positive EtOH screen with counseling documented  (In-House)                   PLAN:          Gastrointestinal hemorrhage, unspecified     Ed has not had recurrent bleeding since getting home.  I have asked him to continue to monitor for this.  He will stay off aspirin indefinitely.  While I would like him to be on it given the previous heart issues, this bleeding was a very significant problem.  If he has recurrent signs of bleeding, he will let us know.     MIPS PHQ-9 Depression Screening: Completed form scanned and in chart; Total Score 5; Negative Depression Screen Positive alcohol screen: Brief counseling on the harms of alcohol use (less than 8 minutes).            Prescriptions:       Refill of: Pantoprazole 40mg Tablets, Delayed Release Take 1 tablet(s) by mouth daily  #90 (Ninety) tablet(s) Refills: 1           Orders:         Depression screen negative  (In-House)         1101F  Pt screen for fall risk; document no falls in past year or only 1 fall w/o injury in past year (ADI)  (In-House)           Positive EtOH screen with counseling documented  (In-House)         3017F  Colorectal CA screen results documented and reviewed (PV)  (In-House)            Anemia due to acute blood loss     Today, we will repeat blood work to assess the anemia.  He has been weak from this.  I would expect gradual improvement in his energy level.  This can take several weeks though.     LABORATORY:   Labs ordered to be performed today include CBC W/O DIFF, Ferritin Serum, Iron Serum, and PT and PTT.            Orders:       07238  BDCB2 - [facility] CBC w/o diff  (Send-Out)         10871  FERR - [facility] Ferritin Serum  (Send-Out)         06755  IRON - [facility] Iron, serum  (Send-Out)         06144  PTPTT - [facility] PT AND PTT  (Send-Out)             Patient Education Handouts:       Anemia           Hypertension     His blood pressure remains lower normal today.  I am not recommending resuming the furosemide.  If he develops significant swelling in the lower legs, he will let us know though.     LABORATORY:  Labs ordered to be performed today include Comprehensive metabolic panel.            Orders:       22179  COMP - [facility] Comp. Metabolic Panel  (Send-Out)            Coronary artery disease, of native coronary artery     He is not having acute issue today related to his heart.          BPH     No change.  He will continue finasteride.             CHARGE CAPTURE           **Please note: ICD descriptions below are intended for billing purposes only and may not represent clinical diagnoses**        Primary Diagnosis:         578.9 Gastrointestinal hemorrhage, unspecified            K92.2    Gastrointestinal hemorrhage, unspecified              Orders:          60795   Transitional care manage service 14 day discharge  (In-House)                Depression screen negative  (In-House)             1101F   Pt screen for fall risk; document no falls in past year or only 1 fall w/o injury in past year (ADI)  (In-House)                Positive EtOH screen with counseling documented  (In-House)             3017F   Colorectal CA screen results documented and reviewed (PV)  (In-House)           285.1 Anemia due to acute blood loss            D62    Acute posthemorrhagic anemia    401.1 Hypertension            I10    Essential (primary) hypertension    414.01 Coronary artery disease, of native coronary artery            I25.10     Atherosclerotic heart disease of native coronary artery without angina pectoris    600.00 BPH            N40.0    Benign prostatic hyperplasia without lower urinary tract symptoms

## 2021-05-18 NOTE — PROGRESS NOTES
Keenan Hall 04/12/1930     Office/Outpatient Visit    Visit Date: Tue, Aug 20, 2019 01:15 pm    Provider: Steve Medina MD (Assistant: Isabella Palacios RN)    Location: [facility]        Electronically signed by Steve Medina MD on  08/26/2019 12:31:50 PM                             SUBJECTIVE:        CC: lower GI bleed     Ed is a 89 year old White male.  He is here today following a transition of care from an inpatient hospital: [facility]. The patient was admitted on 8/5/19 and discharged on 8/8/19. The patient was admitted for rectal bleed. Our office called the patient within 48 hours of discharge and scheduled the follow-up appointment. During the patient's hospital stay the patient was treated by Dr. Pillai. Medications have been reviewed and reconciled with discharge summary..  PT IS NOT TAKING PROTONIX AT THIS TIME         HPI:     Ed is in today for followup after his recent hospital stay.  He was admitted to [facility], as noted above, for lower GI bleed.  He had a large bloody bowel movement that actually led to the admission.  He was there for three to four days.  He did have a fairly significant drop in his Hgb.  It was essentially normal on his presentation, but it dropped to around 8.  The decision was made to go ahead and transfuse him with two units.  The bleeding stopped while he was in the hospital, and he was discharged home with followup plans to see Dr. Zimmer in consultation.  Ed has had no bleeding since getting home. He is a little weak, but he is doing well overall.  He denies other acute complaint regarding this.  He was again moderately anemic, and he is on iron supplement presently.         He also has a history of BPH, for which he remains on finasteride.  He denies any significant urinary symptoms at this time.         He also has a skin lesion on the right lower extremity.  He is asking about whether that should be removed.  It is, by the way, mentioned in his  discharge summary.              Concerning atrial fibrillation, he is here for routine follow-up for atrial fibrillation. Current related medications include a beta blocker for rate control.   He is extremely compliant with his medication regimen.      ROS:     CONSTITUTIONAL:  Negative for chills and fever.      CARDIOVASCULAR:  Negative for chest pain and palpitations.      RESPIRATORY:  Negative for recent cough and dyspnea.      GASTROINTESTINAL:  Negative for abdominal pain, nausea and vomiting.      INTEGUMENTARY:  Negative for atypical mole(s) and rash.          PMH/FMH/SH:     Last Reviewed on 8/20/2019 01:38 PM by Steve Medina    Past Medical History:             PAST MEDICAL HISTORY         Positive for    Atrial Fibrillation;     Positive for    diverticular hemmorage 4/2017;     Positive for    Benign Prostatic Hypertrophy;     Positive for    Shingles 2009;         CURRENT MEDICAL PROVIDERS:    Cardiologist: [facility] Cardiologist    Dermatologist: Dr. Bullock    Ophthalmologist: Melquiades- optomitraureliano    Dentist - Andrewonedeo         ADVANCE DIRECTIVE Living will His daughters would make decisions for him if needed.          PREVENTIVE HEALTH MAINTENANCE             COLORECTAL CANCER SCREENING: Up to date (colonoscopy q10y; sigmoidoscopy q5y; Cologuard q3y) was last done 04/2011, Results are in chart; colonoscopy with normal results; diverticulosis         Surgical History:         Biopsy of colonic polyp; 1975         Procedures:     Positive for    Cataract Removal: right; 2009; and    Fracture(s):    Closed fracture of knee;     Positive for    Cataract Removal and    shoulder surgery;;         Family History:     Father: Congestive Heart Failure ( age 85 )     Mother: Lymphoma         Social History: Retired.     Occupation: Retired (Prior occupation: [employer])     Marital Status:  X 2     Children: 5 children         Tobacco/Alcohol/Supplements:     Last Reviewed on 8/20/2019 01:38 PM  by Steve Medina    Tobacco: He has a past history of cigarette smoking; quit date:  1989.          Alcohol: Frequency: Daily When he drinks, the average quantity of alcohol is 4 drinks.          Substance Abuse History:     Last Reviewed on 8/20/2019 01:38 PM by Steve Medina        Mental Health History:     Last Reviewed on 8/20/2019 01:38 PM by Steve Medina        Communicable Diseases (eg STDs):     Last Reviewed on 8/20/2019 01:38 PM by Steve Medina            Current Problems:     Last Reviewed on 8/20/2019 01:38 PM by Steve Medina    BPH     Hypertension     Low back pain     Hypercholesterolemia     Hemorrhagic diverticulosis of colon     Coronary artery disease, of native coronary artery     Atrial fibrillation     Skin mass     Follow-up examination     Anemia due to acute blood loss     Acute posthemorrhagic anemia         Immunizations:     Td adult 5/23/2017     Prevnar 13 (Pneumococcal PCV 13) 1/5/2015     Fluvirin (4 + years dose) 8/8/2014     Fluzone High-Dose pf (>=65 yr) 10/15/2016     Influenza Virus Vaccine, unspecified formulation 9/1/2017     Pneumococcal, 23-valent IM/SC (adult and pt >=2yr) 12/0/2002     PNEUMOVAX 23 (Pneumococcal PPV23) 12/11/2007     Adacel (Tdap) 12/11/2007     Zostavax (Zoster live) 0/0/2010         Allergies:     Last Reviewed on 8/20/2019 01:38 PM by Steve Medina    Bactrim DS:    Doxycycline:    Meloxicam: rectal bleeding    Meloxicam: bleeding (Adverse Reaction)    Codeine:        Current Medications:     Last Reviewed on 8/20/2019 01:38 PM by Steve Medina    Metoprolol Succinate 25mg Tablets, Extended Release Take 1/2 tablet(s) by mouth daily     Finasteride 5mg Tablet Take 1 tablet(s) by mouth daily     Centrum Silver Men 50+ Tablet Take 1 tablet(s) by mouth daily with food.     Polysaccharide Iron Complex 150mg Capsules 1 capsule bid     Protonix 40mg Tablets, Delayed Release 1 po q day      Mupirocin 2% Topical Ointment apply affected area Qam         OBJECTIVE:        Vitals:         Current: 8/20/2019 1:21:33 PM    Ht:  6 ft, 0 in;  Wt: 194.4 lbs;  BMI: 26.4    T: 97.5 F (oral);  BP: 119/67 mm Hg (left arm, sitting);  P: 74 bpm (left arm (BP Cuff), sitting);  sCr: 0.81 mg/dL;  GFR: 64.29        Exams:     PHYSICAL EXAM:     GENERAL: Vitals recorded well developed, well nourished;     NECK: range of motion is normal; thyroid is non-palpable;     RESPIRATORY: normal respiratory rate and pattern with no distress; normal breath sounds with no rales, rhonchi, wheezes or rubs;     CARDIOVASCULAR: normal rate; rhythm is regular;  no systolic murmur;     GASTROINTESTINAL: nontender; normal bowel sounds; no masses;     LYMPHATIC: no enlargement of cervical or facial nodes; no supraclavicular nodes;     SKIN: there is a large skin mass on the L lower leg medially;     NEUROLOGIC: mental status: alert and oriented x 3; cranial nerves II-XII grossly intact;     PSYCHIATRIC: appropriate affect and demeanor; normal psychomotor function;         Lab/Test Results:             Hemoglobin:  11.0 (08/20/2019),     Performed by::  [email or web address] (08/20/2019),             ASSESSMENT           285.1   D62  Acute posthemorrhagic anemia              DDx:     427.31   I48.91  Atrial fibrillation              DDx:     600.00   N40.2  BPH              DDx:     782.2   D48.5  Skin mass              DDx:         ORDERS:         Lab Orders:       [id number]  BDHGB - [facility] Hemoglobin (Hgb)  (In-House)           Procedures Ordered:       [id number]  Collection of capillary blood specimen (eg, finger, heel, ear stick)  (In-House)                   PLAN:          Acute posthemorrhagic anemia     1)Today we have reviewed Ed's care.  We will go ahead and recheck a hemoglobin level on him.     2)I have asked him to go ahead and see Dr. Zimmer as scheduled, and I do think colonoscopy would be reasonable at this point to make sure there is not an obvious  bleeding source that could be addressed.      3)His other care and problems are reviewed, and I recommend no changes in his medications.     4)The skin mass on the right lower extremity probably needs to be excised, and we will ask him to mention it to Dr. Zimmer to see if he would want to do it.  If not, we will have the patient see dermatology.     5)Will follow closely.     LABORATORY:  Labs ordered to be performed today include Hemoglobin.            Orders:       [id number]  BDHGB - [facility] Hemoglobin (Hgb)  (In-House)         88037  Collection of capillary blood specimen (eg, finger, heel, ear stick)  (In-House)             Patient Education Handouts:       Diverticulosis and Diverticulitis           Atrial fibrillation As above.          BPH As above.          Skin mass As above.             CHARGE CAPTURE           **Please note: ICD descriptions below are intended for billing purposes only and may not represent clinical diagnoses**        Primary Diagnosis:         285.1 Acute posthemorrhagic anemia            D62    Acute posthemorrhagic anemia              Orders:          88845   Office/outpatient visit; established patient, level 4  (In-House)             31901   BDHGB - [facility] Hemoglobin (Hgb)  (In-House)             73429   Collection of capillary blood specimen (eg, finger, heel, ear stick)  (In-House)           427.31 Atrial fibrillation            I48.91    Unspecified atrial fibrillation    600.00 BPH            N40.2    Nodular prostate without lower urinary tract symptoms    782.2 Skin mass            D48.5    Neoplasm of uncertain behavior of skin

## 2021-07-01 VITALS
DIASTOLIC BLOOD PRESSURE: 71 MMHG | BODY MASS INDEX: 26.44 KG/M2 | SYSTOLIC BLOOD PRESSURE: 117 MMHG | WEIGHT: 195.2 LBS | TEMPERATURE: 97.7 F | HEART RATE: 79 BPM | HEIGHT: 72 IN

## 2021-07-01 VITALS
WEIGHT: 196.8 LBS | HEART RATE: 71 BPM | SYSTOLIC BLOOD PRESSURE: 125 MMHG | TEMPERATURE: 97.9 F | HEIGHT: 72 IN | BODY MASS INDEX: 26.66 KG/M2 | DIASTOLIC BLOOD PRESSURE: 71 MMHG

## 2021-07-01 VITALS
HEART RATE: 83 BPM | WEIGHT: 196 LBS | SYSTOLIC BLOOD PRESSURE: 121 MMHG | HEIGHT: 72 IN | TEMPERATURE: 97.5 F | DIASTOLIC BLOOD PRESSURE: 62 MMHG | BODY MASS INDEX: 26.55 KG/M2

## 2021-07-01 VITALS
WEIGHT: 194.4 LBS | HEIGHT: 72 IN | DIASTOLIC BLOOD PRESSURE: 67 MMHG | SYSTOLIC BLOOD PRESSURE: 119 MMHG | HEART RATE: 74 BPM | TEMPERATURE: 97.5 F | BODY MASS INDEX: 26.33 KG/M2

## 2021-07-01 VITALS
TEMPERATURE: 97.5 F | DIASTOLIC BLOOD PRESSURE: 76 MMHG | WEIGHT: 193.4 LBS | BODY MASS INDEX: 26.19 KG/M2 | HEIGHT: 72 IN | SYSTOLIC BLOOD PRESSURE: 132 MMHG | HEART RATE: 88 BPM

## 2021-07-01 VITALS
HEART RATE: 78 BPM | WEIGHT: 203.6 LBS | HEIGHT: 72 IN | BODY MASS INDEX: 27.58 KG/M2 | DIASTOLIC BLOOD PRESSURE: 75 MMHG | SYSTOLIC BLOOD PRESSURE: 121 MMHG | TEMPERATURE: 98.4 F

## 2021-07-01 VITALS
DIASTOLIC BLOOD PRESSURE: 75 MMHG | WEIGHT: 196.2 LBS | TEMPERATURE: 97.8 F | HEART RATE: 75 BPM | SYSTOLIC BLOOD PRESSURE: 132 MMHG | HEIGHT: 72 IN | BODY MASS INDEX: 26.57 KG/M2

## 2021-07-02 VITALS
BODY MASS INDEX: 24.43 KG/M2 | HEART RATE: 96 BPM | SYSTOLIC BLOOD PRESSURE: 116 MMHG | HEIGHT: 72 IN | TEMPERATURE: 97.5 F | WEIGHT: 180.4 LBS | DIASTOLIC BLOOD PRESSURE: 61 MMHG